# Patient Record
Sex: FEMALE | Race: WHITE | URBAN - METROPOLITAN AREA
[De-identification: names, ages, dates, MRNs, and addresses within clinical notes are randomized per-mention and may not be internally consistent; named-entity substitution may affect disease eponyms.]

---

## 2017-01-19 ENCOUNTER — OFFICE VISIT (OUTPATIENT)
Dept: ORTHOPEDICS | Facility: CLINIC | Age: 52
End: 2017-01-19

## 2017-01-19 VITALS — BODY MASS INDEX: 31.82 KG/M2 | HEIGHT: 66 IN | WEIGHT: 198 LBS

## 2017-01-19 DIAGNOSIS — M25.551 HIP PAIN, RIGHT: ICD-10-CM

## 2017-01-19 DIAGNOSIS — M70.61 TROCHANTERIC BURSITIS OF BOTH HIPS: ICD-10-CM

## 2017-01-19 DIAGNOSIS — M70.62 TROCHANTERIC BURSITIS OF BOTH HIPS: ICD-10-CM

## 2017-01-19 DIAGNOSIS — M25.552 HIP PAIN, LEFT: Primary | ICD-10-CM

## 2017-01-19 RX ORDER — METHYLPREDNISOLONE ACETATE 40 MG/ML
40 INJECTION, SUSPENSION INTRA-ARTICULAR; INTRALESIONAL; INTRAMUSCULAR; SOFT TISSUE ONCE
Qty: 2 ML | Refills: 0 | OUTPATIENT
Start: 2017-01-19 | End: 2017-01-19

## 2017-01-19 RX ORDER — BUPIVACAINE HYDROCHLORIDE 5 MG/ML
12 INJECTION, SOLUTION PERINEURAL ONCE
Qty: 12 ML | Refills: 0 | OUTPATIENT
Start: 2017-01-19 | End: 2017-01-19

## 2017-01-19 RX ORDER — LIDOCAINE HYDROCHLORIDE 10 MG/ML
6 INJECTION, SOLUTION INFILTRATION; PERINEURAL ONCE
Qty: 6 ML | Refills: 0 | OUTPATIENT
Start: 2017-01-19 | End: 2017-01-19

## 2017-01-19 NOTE — Clinical Note
1/19/2017       RE: Becka Calle  [address] [place] [address]     Dear Colleague,    Thank you for referring your patient, Becka Calle, to the [facility] at [facility]. Please see a copy of my visit note below.    HISTORY OF PRESENT ILLNESS:  Becka is an individual on whom back in 2015 I performed an SI fusion in conjunction with Dr. Yu, and that has done very well.  She just recently had lumbar spine fusion by Dr. Reina from [facility] and is back today requesting an injection of her trochanteric bursa, with bursitis bilaterally.  Apparently, she gets better results with my injections than anybody else.      ASSESSMENT:  Trochanteric bursitis.      PLAN:  Today, after informed consent was obtained, she was sterilely prepped and draped, and she was injected with 3 mL of lidocaine 1%, 6 mL of Marcaine 0.5% and Depo-Medrol 40 mg, bilaterally on each hip.  She had initial improvement of symptoms associated with the local anesthetic injection.  I also told her I would like to get her in to see a hip doctor and made a referral to Dr. Rik Valdovinos, who would be delighted to see her.       Again, thank you for allowing me to participate in the care of your patient.      Sincerely,    Justin Sampson MD

## 2017-01-19 NOTE — PROGRESS NOTES
HISTORY OF PRESENT ILLNESS:  Becka is an individual on whom back in 2015 I performed an SI fusion in conjunction with Dr. Yu, and that has done very well.  She just recently had lumbar spine fusion by Dr. Reina from [facility] and is back today requesting an injection of her trochanteric bursa, with bursitis bilaterally.  Apparently, she gets better results with my injections than anybody else.      ASSESSMENT:  Trochanteric bursitis.      PLAN:  Today, after informed consent was obtained, she was sterilely prepped and draped, and she was injected with 3 mL of lidocaine 1%, 6 mL of Marcaine 0.5% and Depo-Medrol 40 mg, bilaterally on each hip.  She had initial improvement of symptoms associated with the local anesthetic injection.  I also told her I would like to get her in to see a hip doctor and made a referral to Dr. Rik Valdovinos, who would be delighted to see her.

## 2017-01-19 NOTE — NURSING NOTE
"Reason For Visit:   Chief Complaint   Patient presents with     RECHECK     madhav hip pain, s/p madhav bursa injections 10/12/16       Primary MD: Shanna Mccallum  Ref. MD: Dr. Yu      Occupation none.  Currently working? No.  Work status?  On disability.  Date of injury: none    Date of surgery: 12.1.15  Type of surgery: OPERATION PERFORMED:  There were 2 separate episodes of surgery:    Episode of surgery #1:  Anterior ilioinguinal open approach right plate removal and SI joint revision of fusion.    Episode of surgery #2:  Prone image-guided iliosacral screw fixation.  .  Smoker: No    Ht 1.67 m (5' 5.75\")  Wt 89.812 kg (198 lb)  BMI 32.20 kg/m2    Pain Assessment  Patient Currently in Pain: Yes  0-10 Pain Scale: 8  Primary Pain Location: Back  Pain Orientation: Lower  Other Pain Locations: both hips  Pain Descriptors: Aching  Alleviating Factors: NSAIDS, Ice  Aggravating Factors: Walking    Oswestry (KAVAY) Questionnaire    OSWESTRY DISABILITY INDEX 1/19/2017   Section 1 - Pain intensity -   Section 2 - Personal care (washing, dressing, etc.)  -   Section 3 - Lifting -   Section 4 - Walking -   Section 5 - Sitting -   Section 6 - Standing -   Section 7 - Sleeping -   Section 8 - Sex life (if applicable) -   Section 9 - Social life -   Section 10 - Traveling -   Count -   Sum -   Oswestry Score (%) -   SECTION 1-PAIN INTENSITY 4  The pain is very severe at the moment.   SECTION 2-PERSONAL CARE (WASHING,DRESSING,ETC.) 3  I need some help but manage most of my personal care.   SECTION 3-LIFTING 4  I can only lift very light weights.   SECTION 4-WALKING 3  Pain prevents me from walking more than 100 yards.   SECTION 5-SITTING 4  Pain prevents me from sitting for more than 10 minutes   SECTION 6-STANDING 4  Pain prevents me from standing for more than 10 minutes.   SECTION 7-SLEEPING 3  Because of pain I have less than 4 hours sleep.   SECTION 8-SEX LIFE (IF APPLICABLE) 3  My sex life is severely restricted by " pain.   SECTION 9-SOCIAL LIFE 4  Pain has restricted my social life to home.   SECTION 10-TRAVELING 4  Pain restricts me to short necessary trips of under 30 minutes.   Oswestry Disability Index: Count 10   KAVYA: Total Score = SUM (total for answered questions) 36   Computed Oswestry Score 72 (%)                      Numeric Rating Scale:  VAS Scores     VAS Survey 1/19/2017   What is your level of back pain during the last week: 8.0   What is your level of RIGHT leg pain during the last week: 8.0   What is your level of LEFT leg pain during the last week: 5.0   What is your level of neck pain during the last week: 0.5   What is your level of RIGHT arm pain during the last week: 0.5   What is your level of LEFT arm pain during the last week: 0.5                Promis 10 Assessment    PROMIS 10 1/19/2017   In general, would you say your health is: Very Good = 4   In general, would you say your quality of life is: Good = 3   In general, how would you rate your physical health? Very good = 4   In general, how would you rate your mental health, including your mood and your ability to think? Very good = 4   In general, how would you rate your satisfaction with your social activities and relationships? Fair = 2   In general, please rate how well you carry out your usual social activities and roles Fair = 2   To what extent are you able to carry out your everyday physical activities such as walking, climbing stairs, carrying groceries, or moving a chair? A Little = 2   How often have you been bothered by emotional problems such as feeling anxious, depressed or irritable? Rarely = 4   How would you rate your fatigue on average? Moderate = 3   How would you rate your pain on average?   0 = No Pain  to  10 = Worst Imaginable Pain 7   Global Physical Health Score : Raw Score 11 (SUM : G03 - G06 - G07 - G08)   Global Mentall Health Score : Raw Score 13 (SUM : G02 - G04 - G05 - G10)   Total (Physical + Mental Health Score) 24    In general, would you say your health is: -   In general, would you say your quality of life is: -   In general, how would you rate your physical health? -   In general, how would you rate your mental health, including your mood and your ability to think? -   In general, how would you rate your satisfaction with your social activities and relationships? -   In general, please rate how well you carry out your usual social activities and roles. (This includes activities at home, at work and in your community, and responsibilities as a parent, child, spouse, employee, friend, etc.) -   To what extent are you able to carry out your everyday physical activities such as walking, climbing stairs, carrying groceries, or moving a chair? -   In the past 7 days, how often have you been bothered by emotional problems such as feeling anxious, depressed, or irritable? -   In the past 7 days, how would you rate your fatigue on average? -   In the past 7 days, how would you rate your pain on average, where 0 means no pain, and 10 means worst imaginable pain? -   Global Mental Health Score -   Global Physical Health Score -   PROMIS TOTAL - SUBSCORES -   Pain question re-calculation - no clinical value [facility]  [address] [place] [address]  Dept: [phone]  ______________________________________________________________________________    Patient: Becka Calle   : 1965   MRN: [id number]   2017    INVASIVE PROCEDURE SAFETY CHECKLIST    Date: 2017   Procedure:steroid injection to madhav trochanteric bursa  Patient Name: Becka Calle  MRN: [id number]  YOB: 1965    Action: Complete sections as appropriate. Any discrepancy results in a HARD COPY until resolved.     PRE PROCEDURE:  Patient ID verified with 2 identifiers (name and  or MRN): Yes  Procedure and site verified with patient/designee (when able): Yes  Accurate  consent documentation in medical record: Yes  H&P (or appropriate assessment) documented in medical record: NA  H&P must be up to 20 days prior to procedure and updates within 24 hours of procedure as applicable: NA  Relevant diagnostic and radiology test results appropriately labeled and displayed as applicable: NA  Procedure site(s) marked with provider initials: NA    TIMEOUT:  Time-Out performed immediately prior to starting procedure, including verbal and active participation of all team members addressing the following:YesTeaching Flowsheet   Relevant Diagnosis: madhav hip trochanteric bursitis  Teaching Topic: steroid injection to madhav hip trochanteric bursa     Person(s) involved in teaching:   Patient     Motivation Level:  Asks Questions: Yes  Eager to Learn: Yes  Cooperative: Yes  Receptive (willing/able to accept information): Yes  Any cultural factors/Lutheran beliefs that may influence understanding or compliance? No       Patient demonstrates understanding of the following:  Reason for the appointment, diagnosis and treatment plan: Yes  Knowledge of proper use of medications and conditions for which they are ordered (with special attention to potential side effects or drug interactions): Yes  Which situations necessitate calling provider and whom to contact: Yes       Teaching Concerns Addressed:        Proper use and care of n/a (medical equip, care aids, etc.): NA  Nutritional needs and diet plan: NA  Pain management techniques: NA  Wound Care: NA  How and/when to access community resources: NA     Instructional Materials Used/Given: verbal instruction         * Correct patient identify  * Confirmed that the correct side and site are marked  * An accurate procedure consent form  * Agreement on the procedure to be done  * Correct patient position  * Relevant images and results are properly labeled and appropriately displayed  * The need to administer antibiotics or fluids for irrigation purposes during  the procedure as applicable   * Safety precautions based on patient history or medication use    DURING PROCEDURE: Verification of correct person, site, and procedures any time the responsibility for care of the patient is transferred to another member of the care team.

## 2017-01-19 NOTE — MR AVS SNAPSHOT
After Visit Summary   1/19/2017    Becka Calle    MRN: [id number]           Patient Information     Date Of Birth          1965        Visit Information        Provider Department      1/19/2017 2:30 PM Justin Sampson MD [facility]        Today's Diagnoses     Hip pain, left    -  1     Hip pain, right            Follow-ups after your visit        Additional Services     ORTHOPEDICS ADULT REFERRAL       Your provider has referred you to: Dr Rik Valdovinos bilat hip pain recurring greater troch injections needs a hip doctor eval rather than a spine doc treating her hips    Please be aware that coverage of these services is subject to the terms and limitations of your health insurance plan.  Call member services at your health plan with any benefit or coverage questions.      Please bring the following to your appointment:    >>   Any x-rays, CTs or MRIs which have been performed.  Contact the facility where they were done to arrange for  prior to your scheduled appointment.    >>   List of current medications   >>   This referral request   >>   Any documents/labs given to you for this referral                  Your next 10 appointments already scheduled     Apr 19, 2017  2:30 PM   (Arrive by 2:15 PM)   NEW HIP with Rik Valdovinos MD   [facility] ([facility])    [address] [place] [address] [phone]            May 25, 2017  2:30 PM   (Arrive by 2:00 PM)   RETURN SPINE with Justin Sampson MD   [facility] ([facility])    [address] [place] [address] [phone]              Who to contact     Please call your clinic at [phone] to:    Ask questions about your health    Make or cancel appointments    Discuss your medicines    Learn about your test results    Speak to your doctor   If you have compliments  "or concerns about an experience at your clinic, or if you wish to file a complaint, please contact [facility] Patient Relations at [phone] or email us at [email or web address]         Additional Information About Your Visit        [employer] Information     [employer] is an electronic gateway that provides easy, online access to your medical records. With [employer], you can request a clinic appointment, read your test results, renew a prescription or communicate with your care team.     To sign up for [employer] visit the website at [email or web address]   You will be asked to enter the access code listed below, as well as some personal information. Please follow the directions to create your username and password.     Your access code is: [email or web address]  Expires: 2017  6:30 AM     Your access code will  in 90 days. If you need help or a new code, please contact your [facility] or call [phone] for assistance.        Care EveryWhere ID     This is your Care EveryWhere ID. This could be used by other organizations to access your [place] medical records  [id number]        Your Vitals Were     Height BMI (Body Mass Index)                5' 5.75\" (1.67 m) 32.20 kg/m2           Blood Pressure from Last 3 Encounters:   12/06/15 141/73   14 99/60   10/27/14 140/80    Weight from Last 3 Encounters:   17 198 lb (89.812 kg)   12/01/15 176 lb 2.4 oz (79.9 kg)   10/29/15 179 lb (81.194 kg)              We Performed the Following     ORTHOPEDICS ADULT REFERRAL          Today's Medication Changes          These changes are accurate as of: 17  3:29 PM.  If you have any questions, ask your nurse or doctor.               These medicines have changed or have updated prescriptions.        Dose/Directions    LORazepam 1 MG tablet   Commonly known as:  ATIVAN   This may have changed:    - how much to take  - when to take this   Used for:  " Anxiety        Dose:  1 mg   Take 1 tablet (1 mg) by mouth every 12 hours as needed for anxiety   Quantity:  60 tablet   Refills:  0         Stop taking these medicines if you haven't already. Please contact your care team if you have questions.     HYDROmorphone 4 MG tablet   Commonly known as:  DILAUDID           hydrOXYzine 25 MG tablet   Commonly known as:  ATARAX                    Primary Care Provider Office Phone # Fax #    Shanna BRIANNE Mccallum PA-C [phone]       [facility] [address] [address]        Thank you!     Thank you for choosing [facility]  for your care. Our goal is always to provide you with excellent care. Hearing back from our patients is one way we can continue to improve our services. Please take a few minutes to complete the written survey that you may receive in the mail after your visit with us. Thank you!             Your Updated Medication List - Protect others around you: Learn how to safely use, store and throw away your medicines at www.disposemymeds.org.          This list is accurate as of: 1/19/17  3:29 PM.  Always use your most recent med list.                   Brand Name Dispense Instructions for use    ARIPiprazole 5 MG tablet    ABILIFY    60 tablet    Take 1 tablet (5 mg) by mouth daily       DIAZEPAM PO      Take 5 mg by mouth every 8 hours as needed       diclofenac 1 % Gel topical gel    VOLTAREN    60 g    Small amount to affected area QID prn       LORazepam 1 MG tablet    ATIVAN    60 tablet    Take 1 tablet (1 mg) by mouth every 12 hours as needed for anxiety       losartan 50 MG tablet    COZAAR     Take 50 mg by mouth daily       methocarbamol 500 MG tablet    ROBAXIN    30 tablet    TAKE 1 TABLET(500 MG) BY MOUTH THREE TIMES DAILY AS NEEDED FOR MUSCLE SPASMS       metoprolol 100 MG 24 hr tablet    TOPROL-XL    90 tablet    Take 1 tablet (100 mg) by mouth daily       NYSTOP 594368 UNIT/GM Powd powder       Externally apply topically 2 times daily as needed       OMEPRAZOLE PO      Take 20 mg by mouth every morning       * OXYCODONE HCL PO      Take 10 mg by mouth 4 times daily       * oxyCODONE 20 MG 12 hr tablet    OxyCONTIN    40 tablet    Take 1 tablet (20 mg) by mouth every 12 hours       senna-docusate 8.6-50 MG per tablet    SENOKOT-S;PERICOLACE    80 tablet    Take 2 tablets by mouth 2 times daily as needed for constipation Pt taking differently: 7 tablets every other evening       simvastatin 10 MG tablet    ZOCOR     Take 10 mg by mouth At Bedtime       SUMAtriptan 100 MG tablet    IMITREX     Take by mouth at onset of headache       traZODone 50 MG tablet    DESYREL     Take by mouth At Bedtime       venlafaxine 75 MG 24 hr capsule    EFFEXOR-XR    270 capsule    Take 3 capsules (225 mg) by mouth daily       * Notice:  This list has 2 medication(s) that are the same as other medications prescribed for you. Read the directions carefully, and ask your doctor or other care provider to review them with you.

## 2017-02-15 ENCOUNTER — PRE VISIT (OUTPATIENT)
Dept: ORTHOPEDICS | Facility: CLINIC | Age: 52
End: 2017-02-15

## 2017-02-15 NOTE — TELEPHONE ENCOUNTER
1.  Date/reason for appt: 4/19/17 - Requesting Bursa Injection  2.  Referring provider: Dr. Sampson  3.  Call to patient (Yes / No - short description): No, established pt to Ortho clinic  4.  Previous care at / records requested from: [facility] -- Records and imaging in Epic/Pacs. Camilla pt, pt was also seen with Ирина Keenan.

## 2017-04-19 ENCOUNTER — OFFICE VISIT (OUTPATIENT)
Dept: ORTHOPEDICS | Facility: CLINIC | Age: 52
End: 2017-04-19

## 2017-04-19 VITALS — WEIGHT: 201 LBS | BODY MASS INDEX: 32.3 KG/M2 | HEIGHT: 66 IN

## 2017-04-19 DIAGNOSIS — M70.62 GREATER TROCHANTERIC BURSITIS OF BOTH HIPS: Primary | ICD-10-CM

## 2017-04-19 DIAGNOSIS — M25.551 HIP PAIN, RIGHT: Primary | ICD-10-CM

## 2017-04-19 DIAGNOSIS — M70.61 GREATER TROCHANTERIC BURSITIS OF BOTH HIPS: Primary | ICD-10-CM

## 2017-04-19 ASSESSMENT — ENCOUNTER SYMPTOMS
POLYDIPSIA: 0
POSTURAL DYSPNEA: 0
WEIGHT GAIN: 1
HYPERTENSION: 1
NECK MASS: 0
FLANK PAIN: 0
COUGH: 0
EYE PAIN: 0
STIFFNESS: 1
MUSCLE WEAKNESS: 1
DYSPNEA ON EXERTION: 0
SINUS CONGESTION: 1
NECK PAIN: 0
SPUTUM PRODUCTION: 1
LIGHT-HEADEDNESS: 0
DIFFICULTY URINATING: 0
POLYPHAGIA: 0
SORE THROAT: 0
DOUBLE VISION: 0
ALTERED TEMPERATURE REGULATION: 1
TROUBLE SWALLOWING: 0
HYPOTENSION: 0
NAIL CHANGES: 0
EXERCISE INTOLERANCE: 1
SYNCOPE: 0
RESPIRATORY PAIN: 0
SHORTNESS OF BREATH: 0
BACK PAIN: 1
BRUISES/BLEEDS EASILY: 0
HEMOPTYSIS: 0
WHEEZING: 0
HEMATURIA: 0
SNORES LOUDLY: 1
SINUS PAIN: 1
FEVER: 0
EYE WATERING: 0
EYE IRRITATION: 0
POOR WOUND HEALING: 0
SKIN CHANGES: 0
LEG SWELLING: 0
EYE REDNESS: 0
HOARSE VOICE: 0
TACHYCARDIA: 0
SMELL DISTURBANCE: 0
MUSCLE CRAMPS: 0
CHILLS: 0
TASTE DISTURBANCE: 0
SWOLLEN GLANDS: 0
DECREASED APPETITE: 0
NIGHT SWEATS: 0
WEIGHT LOSS: 0
INCREASED ENERGY: 1
ARTHRALGIAS: 1
CLAUDICATION: 0
PALPITATIONS: 0
SLEEP DISTURBANCES DUE TO BREATHING: 0
LEG PAIN: 1
JOINT SWELLING: 0
COUGH DISTURBING SLEEP: 0
MYALGIAS: 1
ORTHOPNEA: 0
FATIGUE: 1

## 2017-04-19 ASSESSMENT — HOOS JR
GOING UP OR DOWN STAIRS: 3
WALKING ON UNEVEN SURFACE: 4
BENDING TO THE FLOOR TO PICK UP OBJECT: 4
HOOS JR TOTAL INTERVAL SCORE: 46.65
WHAT AMOUNT OF HIP PAIN HAVE YOU EXPERIENCED THE LAST WEEK DURING THE FOLLOWING ACTIVITIES: 1
HOOS JR FUNCTION SCORE: 1
LYING IN BED (TURNING OVER, MAINTAINING HIP POSITION): 3
SITTING: 3
RISING FROM SITTING: 3

## 2017-04-19 NOTE — NURSING NOTE
"Reason For Visit:   Chief Complaint   Patient presents with     Consult     Bilateral hip pain. patient requesting repeat bursa injection, DOS 12/1/15 anterior right SI joint fusion, referred by Dr. Sampson.       Primary MD: Shanna Mccallum      Ht 1.676 m (5' 6\")  Wt 91.2 kg (201 lb)  BMI 32.44 kg/m2    Pain Assessment  Patient Currently in Pain: Yes  0-10 Pain Scale: 7  Primary Pain Location: Hip  Pain Orientation: Right, Left  Pain Descriptors: Constant, Discomfort      Current Outpatient Prescriptions   Medication     OXYCODONE HCL PO     methocarbamol (ROBAXIN) 500 MG tablet     oxyCODONE (OXYCONTIN) 20 MG 12 hr tablet     senna-docusate (SENOKOT-S;PERICOLACE) 8.6-50 MG per tablet     losartan (COZAAR) 50 MG tablet     Nystatin (NYSTOP) 925644 UNIT/GM POWD     simvastatin (ZOCOR) 10 MG tablet     diclofenac (VOLTAREN) 1 % GEL     DIAZEPAM PO     OMEPRAZOLE PO     traZODone (DESYREL) 50 MG tablet     SUMAtriptan (IMITREX) 100 MG tablet     LORazepam (ATIVAN) 1 MG tablet     venlafaxine (EFFEXOR-XR) 75 MG 24 hr capsule     metoprolol (TOPROL-XL) 100 MG 24 hr tablet     ARIPiprazole (ABILIFY) 5 MG tablet     No current facility-administered medications for this visit.           Allergies   Allergen Reactions     Buspirone      Excessively axnious     Compazine      Lock jaw     Dilantin [Alc-Phenytoin Na-Propylene Glycol]      Bleeding around gums     Gabapentin      Cannot stay awake      Keflex [Cephalexin Hcl] Hives     Pt states she passed out from this medication     Penicillins Hives     Pt states she passed out form taking this medication     Reglan      Vomiting     Requip      Cannot breathe     Wellbutrin [Bupropion Hydrobromide]      Acid reflux     Sulfa Drugs Rash     "

## 2017-04-19 NOTE — PROGRESS NOTES
[facility] Physicians, Orthopaedic Surgery, Arthritis, Hip and Knee Replacement    Becka Calle MRN# [id number]   Age: 51 year old YOB: 1965     Requesting physician: Justin Sampson              History of Present Illness:   Becka Calle is a 51F  Becka Mccall is a pleasant 51-year-old female who was referred to Dr. Valdovinos's clinic from Dr. Sampson for bilateral hip trochanteric pain.  The patient does have a history of chronic low back pain including SI joint fusion with subsequent revisions and lumbar fusions.  She reports that she has had the bilateral hip pain since childhood.  She denies any specific trauma or injuries that brought them on.  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  Previous treatments include repeated injections, most recently on 01/19/2017 by Dr. Sampson.  With this, she had approximately 90%-100% relief initially with the lidocaine portion of the injection.  These injections do last the injections.  These injections do last for about 2-3 months.  She has also tried ice and takes narcotics.  She currently takes oxycodone 10 mg t.i.d. and OxyContin 20 mg b.i.d. prescribed by her primary care provider for many years.  She feels that the right is worse, but that the left hip is now worsening.  It particularly hurts when she is resting at night and lying on her sides.  She denies any consistent groin or thigh pain.  She denies any recent lumbar injections.  She does use a cane or walker occasionally for longer distances and she is limited to 1 block due to pain.  She also uses a chair lift at home for up and down the stairs when her pain is particularly bad.  She has tried physical therapy in the past and does a home exercise program for working on her strength.  She also has been evaluated in pain clinics for her low back, SI joint and hip pain.  She recently spent an entire  month at the [facility] in a pain evaluation process.  She currently denies any mechanical symptoms of the hip.  She denies any numbness, tingling or weakness.          Prior history of blood clot: none  Prior history of bleeding problems: mild anemia, no bleeding dyscrasias  Prior history of anesthetic complications: none  Currently on opioids:  yes, oxycodone 10 mg TID, oxycontin 20 mg BID  History of Diabetes: none  Disability, was , no smoke, no etoh, [place] in [place], house with     Background history:  DX:  1. Bilateral trochanteric bursitis    TREATMENTS:  1. Trochanteric bursal injections, most recently 1/19/2017 by Dr Sampson             Past Medical History:     Patient Active Problem List   Diagnosis     HTN (hypertension)     Hyperlipidemia with target LDL less than 130     Anxiety     Chronic pain disorder     SI (sacroiliac) joint dysfunction     Past Medical History:   Diagnosis Date     Agoraphobia      Depression      Epilepsy (H)      Hyperlipidemia      Hypertension      Lumbar disc disease      Migraines      Obesity      Pain medication agreement      PTSD (post-traumatic stress disorder)      RLS (restless legs syndrome)      Tachycardia      Vitamin D deficiency                 Past Surgical History:     Past Surgical History:   Procedure Laterality Date     CARPAL TUNNEL RELEASE RT/LT       FUSION SACRAL ILIAC  March 2013     HYSTERECTOMY, PAP NO LONGER INDICATED       LAMINECTOMY, FUSION LUMBAR THREE+ LEVEL, COMBINED  march 2010    had revision     laparoscopic gastrectomy sleeve       OPEN REDUCTION INTERNAL FIXATION PELVIS Right 12/1/2015    Procedure: OPEN REDUCTION INTERNAL FIXATION PELVIS;  Surgeon: Christian Yu MD;  Location: [facility] OR     OPTICAL TRACKING SYSTEM FUSION SACRAL ILIAC Right 12/1/2015    Procedure: OPTICAL TRACKING SYSTEM FUSION SACRAL ILIAC;  Surgeon: Justin Sampson MD;  Location: [facility] OR     REMOVE HARDWARE ANTERIOR PELVIS Right  2015    Procedure: REMOVE HARDWARE ANTERIOR PELVIS;  Surgeon: Christian Yu MD;  Location: [facility] OR     SALPINGO OOPHORECTOMY,R/L/CHANELLE      Salpingo Oophorectomy, RT/LT/CHANELLE     TONSILLECTOMY & ADENOIDECTOMY              Social History:     Social History     Social History     Marital status:      Spouse name: N/A     Number of children: N/A     Years of education: N/A     Occupational History     Not on file.     Social History Main Topics     Smoking status: Former Smoker     Quit date: 10/10/2008     Smokeless tobacco: Never Used      Comment: 2 cartons in 25 years     Alcohol use No     Drug use: No     Sexual activity: Not on file     Other Topics Concern     Not on file     Social History Narrative     The patient lives in [place] with her  in house.          Family History:       Family History   Problem Relation Age of Onset     Genitourinary Problems Mother 88     born [birth year]     Hypertension Mother 40     HEART DISEASE Father 50      87yo and dej disc     Family History Negative Son      Breast Cancer Maternal Grandmother       41yo      Alzheimer Disease Maternal Grandfather 70      71yo      CEREBROVASCULAR DISEASE Maternal Grandfather      age?     CEREBROVASCULAR DISEASE Mother 50     HEART DISEASE Paternal Grandmother       59yo      HEART DISEASE Paternal Grandfather       59yo     HEART DISEASE Brother 50     Family History Negative Brother      Family History Negative Sister      Family History Negative Sister        Family history of blood clot: none  Family history of bleeding problems: none  Family history of anesthetic complications: none         Medications:     Current Outpatient Prescriptions   Medication Sig     OXYCODONE HCL PO Take 10 mg by mouth 3 times daily      methocarbamol (ROBAXIN) 500 MG tablet TAKE 1 TABLET(500 MG) BY MOUTH THREE TIMES DAILY AS NEEDED FOR MUSCLE SPASMS     oxyCODONE (OXYCONTIN) 20 MG 12 hr tablet  "Take 1 tablet (20 mg) by mouth every 12 hours     senna-docusate (SENOKOT-S;PERICOLACE) 8.6-50 MG per tablet Take 2 tablets by mouth 2 times daily as needed for constipation Pt taking differently: 7 tablets every other evening     losartan (COZAAR) 50 MG tablet Take 50 mg by mouth daily     Nystatin (NYSTOP) 461536 UNIT/GM POWD Externally apply topically 2 times daily as needed     simvastatin (ZOCOR) 10 MG tablet Take 10 mg by mouth At Bedtime     diclofenac (VOLTAREN) 1 % GEL Small amount to affected area QID prn     DIAZEPAM PO Take 5 mg by mouth every 8 hours as needed      OMEPRAZOLE PO Take 20 mg by mouth every morning     traZODone (DESYREL) 50 MG tablet Take by mouth At Bedtime     SUMAtriptan (IMITREX) 100 MG tablet Take by mouth at onset of headache     LORazepam (ATIVAN) 1 MG tablet Take 1 tablet (1 mg) by mouth every 12 hours as needed for anxiety (Patient taking differently: Take 2 mg by mouth At Bedtime )     venlafaxine (EFFEXOR-XR) 75 MG 24 hr capsule Take 3 capsules (225 mg) by mouth daily     metoprolol (TOPROL-XL) 100 MG 24 hr tablet Take 1 tablet (100 mg) by mouth daily     ARIPiprazole (ABILIFY) 5 MG tablet Take 1 tablet (5 mg) by mouth daily     No current facility-administered medications for this visit.                   Physical Exam:     EXAMINATION pertinent findings:   VITAL SIGNS: Height 1.676 m (5' 6\"), weight 91.2 kg (201 lb).  Body mass index is 32.44 kg/(m^2).  GEN: AOx3, appears stated age, cooperative, no distress  HEENT: normocephalic, atraumatic  RESP: non labored breathing   ABD: benign   SKIN: No rashes or open lesions   CV: Non-tachycardic   NEURO: CN2-12 grossly intact   VASCULAR: 2+ DP pulse   MUSCULOSKELETAL:   The patient demonstrates an antalgic gait with decreased stance time on the right side.  In standing position, she is able to support a single limb stance, although this causes a significant amount of pain.  There is no obvious Trendelenburg sign bilaterally.  She " has significant tenderness to palpation along the greater trochanter, and this elicits a yelp from the patient when either right side or left side is palpated.      She otherwise has full range of motion of the bilateral hips.  She has a smooth arc of motion through full range from 0-110 degrees of flexion and extension bilaterally.  She has full internal and external rotation with no crepitus.  Both movements do cause some pain along the greater trochanter, but none within the groin.  She has a negative straight leg raise test for back pain or radiating pain.  She demonstrates significant weakness an abductor complex with side-lying hip abduction.  She is unable to perform a sustained abduction position against resistant force, and this elicits a significant amount of pain bilaterally.  Clamshell exercises also demonstrates weakness of the gluteus medius.  She has a negative Pooja's test for a tight IT band, but does elicit pain, right greater than left.  CMS is intact.  Motor intact to EHL/FHL/TA/GSC.  SILT to SP, DP, sural, saphenous and tibial nerve distributions.  Foot is warm and well perfused, and cap refill is less than 2 seconds.   Dictated by Ari Villagomez MD, Resident                    Data:   Imagin2017 bilateral hip AP/lat   -no acute bony pathology, hips are concentric with well maintained joint line             Assessment and Plan:   Assessment:  51F with hx of chronic low back pain with pervious lumbar and SI joint fusions, with chronic greater trochanteric bursitis in setting of hip abductor weakness    DIscussed with patient that her symptoms are likely from chronic weakness of abductors and that best treatment would be continued and aggressive strengthening, which is difficult given her chronic pain.  We also discussed that she may benefit from pool therapy.  Recommened follow up in pain clinic and that she consider attempts to wean off the narcotic pain medication.     Plan:  Injections  today, referral to PT  Follow up PRN  Recommend f/u with pain clinic or non-op sports for repeated troch injections    PROCEDURE NOTE:    After obtaining informed consent, under sterile precautions, bilateral greater trochanteric injections were performed.  5 cc of 1% lidocaine and 40 mg/ml of depo-medrol were used.  Sterile dressing applied.  She tolerated the procedure well.  Following the injections, she continued to demonstrate weakness of her bilateral hip abduction complex.       I saw the patient and performed the key portions of the history and physical.  I agree with the note above.      Rik Valdovinos M.D.     Arthritis and Joint Replacement  Department of Orthopaedic Surgery, [facility]  [email or web address]  [phone] (pager)           Review of Systems:       Answers for HPI/ROS submitted by the patient on 4/19/2017   General Symptoms: Yes  Skin Symptoms: Yes  HENT Symptoms: Yes  EYE SYMPTOMS: Yes  HEART SYMPTOMS: Yes  LUNG SYMPTOMS: Yes  INTESTINAL SYMPTOMS: No  URINARY SYMPTOMS: Yes  GYNECOLOGIC SYMPTOMS: No  BREAST SYMPTOMS: No  SKELETAL SYMPTOMS: Yes  BLOOD SYMPTOMS: Yes  NERVOUS SYSTEM SYMPTOMS: No  MENTAL HEALTH SYMPTOMS: No  Fever: No  Loss of appetite: No  Weight loss: No  Weight gain: Yes  Fatigue: Yes  Night sweats: No  Chills: No  Increased stress: No  Excessive hunger: No  Excessive thirst: No  Feeling hot or cold when others believe the temperature is normal: Yes  Loss of height: No  Post-operative complications: Yes  Surgical site pain: No  Change in or Loss of Energy: Yes  Hyperactivity: No  Confusion: No  Changes in hair: Yes  Changes in moles/birth marks: No  Itching: Yes  Rashes: Yes  Changes in nails: No  Acne: No  Hair in places you don't want it: No  Change in facial hair: No  Warts: No  Non-healing sores: No  Flaking of skin: No  Color changes of hands/feet in cold : No  Sun sensitivity: No  Skin thickening: No  Ear pain: No  Ear discharge:  No  Hearing loss: No  Tinnitus: No  Congestion: Yes  Sinus pain: Yes  Trouble swallowing: No   Voice hoarseness: No  Mouth sores: No  Sore throat: No  Tooth pain: No  Gum tenderness: No  Bleeding gums: No  Change in taste: No  Change in sense of smell: No  Dry mouth: No  Hearing aid used: No  Neck lump: No  Eye pain: No  Vision loss: No  Dry eyes: No  Watery eyes: No  Eye bulging: No  Double vision: No  Flashing of lights: No  Spots: No  Floaters: Yes  Redness: No  Crossed eyes: No  Tunnel Vision: No  Yellowing of eyes: No  Eye irritation: No  Cough: No  Sputum or phlegm: Yes  Coughing up blood: No  Difficulty breating or shortness of breath: No  Snoring: Yes  Wheezing: No  Difficulty breathing on exertion: No  Respiratory pain: No  Nighttime Cough: No  Difficulty breathing when lying flat: No  Chest pain or pressure: No  Fast or irregular heartbeat: No  Pain in legs with walking: Yes  Swelling in feet or ankles: No  Trouble breathing while lying down: No  Fingers or Toes appear blue: No  High blood pressure: Yes  Low blood pressure: No  Fainting: No  Murmurs: No  Chest pain on exertion: No  Chest pain at rest: No  Cramping pain in leg during exercise: No  Pacemaker: No  Varicose veins: No  Edema or swelling: No  Fast heart beat: No  Wake up at night with shortness of breath: No  Heart flutters: No  Light-headedness: No  Exercise intolerance: Yes  Trouble holding urine or incontinence: No  Trouble starting or stopping: No  Increased frequency of urination: Yes  Blood in urine: No  Decreased frequency of urination: No  Frequent nighttime urination: No  Flank pain: No  Difficulty emptying bladder: No  Back pain: Yes  Muscle aches: Yes  Neck pain: No  Swollen joints: No  Joint pain: Yes  Bone pain: No  Muscle cramps: No  Muscle weakness: Yes  Joint stiffness: Yes  Bone fracture: No  Anemia: Yes  Swollen glands: No  Easy bleeding or bruising: No

## 2017-04-19 NOTE — NURSING NOTE
[facility]ED[facility]  [address] [place] [id number]  Dept: [phone]  ______________________________________________________________________________    Patient: Becka Calle   : 1965   MRN: [id number]   2017    INVASIVE PROCEDURE SAFETY CHECKLIST    Date: 2017   Procedure:Bilateral Bursa sterioid injections  Patient Name: Becka Calle  MRN: [id number]  YOB: 1965    Action: Complete sections as appropriate. Any discrepancy results in a HARD COPY until resolved.     PRE PROCEDURE:  Patient ID verified with 2 identifiers (name and  or MRN): Yes  Procedure and site verified with patient/designee (when able): Yes  Accurate consent documentation in medical record: Yes  H&P (or appropriate assessment) documented in medical record: Yes  H&P must be up to 20 days prior to procedure and updates within 24 hours of procedure as applicable: Yes  Relevant diagnostic and radiology test results appropriately labeled and displayed as applicable: Yes  Procedure site(s) marked with provider initials: Yes    TIMEOUT:  Time-Out performed immediately prior to starting procedure, including verbal and active participation of all team members addressing the following:Yes  * Correct patient identify  * Confirmed that the correct side and site are marked  * An accurate procedure consent form  * Agreement on the procedure to be done  * Correct patient position  * Relevant images and results are properly labeled and appropriately displayed  * The need to administer antibiotics or fluids for irrigation purposes during the procedure as applicable   * Safety precautions based on patient history or medication use    DURING PROCEDURE: Verification of correct person, site, and procedures any time the responsibility for care of the patient is transferred to another member of the care team.

## 2017-04-19 NOTE — LETTER
4/19/2017       RE: Becka Calle  [address] [place] [address]     Dear Colleague,    Thank you for referring your patient, Becka Calle, to the [facility] at [facility]. Please see a copy of my visit note below.    [facility], Orthopaedic Surgery, Arthritis, Hip and Knee Replacement    Becka Calle MRN# [id number]   Age: 51 year old YOB: 1965     Requesting physician: Justin Sampson              History of Present Illness:   Becka Calle is a 51F  Becka Mccall is a pleasant 51-year-old female who was referred to Dr. Valdovinos's clinic from Dr. Sampson for bilateral hip trochanteric pain.  The patient does have a history of chronic low back pain including SI joint fusion with subsequent revisions and lumbar fusions.  She reports that she has had the bilateral hip pain since childhood.  She denies any specific trauma or injuries that brought them on.  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  Previous treatments include repeated injections, most recently on 01/19/2017 by Dr. Sampson.  With this, she had approximately 90%-100% relief initially with the lidocaine portion of the injection.  These injections do last the injections.  These injections do last for about 2-3 months.  She has also tried ice and takes narcotics.  She currently takes oxycodone 10 mg t.i.d. and OxyContin 20 mg b.i.d. prescribed by her primary care provider for many years.  She feels that the right is worse, but that the left hip is now worsening.  It particularly hurts when she is resting at night and lying on her sides.  She denies any consistent groin or thigh pain.  She denies any recent lumbar injections.  She does use a cane or walker occasionally for longer distances and she is limited to 1 block due to pain.  She also uses a chair lift at  home for up and down the stairs when her pain is particularly bad.  She has tried physical therapy in the past and does a home exercise program for working on her strength.  She also has been evaluated in pain clinics for her low back, SI joint and hip pain.  She recently spent an entire month at the [facility] in a pain evaluation process.  She currently denies any mechanical symptoms of the hip.  She denies any numbness, tingling or weakness.          Prior history of blood clot: none  Prior history of bleeding problems: mild anemia, no bleeding dyscrasias  Prior history of anesthetic complications: none  Currently on opioids:  yes, oxycodone 10 mg TID, oxycontin 20 mg BID  History of Diabetes: none  Disability, was , no smoke, no etoh, lives in [place], house with     Background history:  DX:  1. Bilateral trochanteric bursitis    TREATMENTS:  1. Trochanteric bursal injections, most recently 1/19/2017 by Dr Sampson             Past Medical History:     Patient Active Problem List   Diagnosis     HTN (hypertension)     Hyperlipidemia with target LDL less than 130     Anxiety     Chronic pain disorder     SI (sacroiliac) joint dysfunction     Past Medical History:   Diagnosis Date     Agoraphobia      Depression      Epilepsy (H)      Hyperlipidemia      Hypertension      Lumbar disc disease      Migraines      Obesity      Pain medication agreement      PTSD (post-traumatic stress disorder)      RLS (restless legs syndrome)      Tachycardia      Vitamin D deficiency                 Past Surgical History:     Past Surgical History:   Procedure Laterality Date     CARPAL TUNNEL RELEASE RT/LT       FUSION SACRAL ILIAC  March 2013     HYSTERECTOMY, PAP NO LONGER INDICATED       LAMINECTOMY, FUSION LUMBAR THREE+ LEVEL, COMBINED  march 2010    had revision     laparoscopic gastrectomy sleeve       OPEN REDUCTION INTERNAL FIXATION PELVIS Right 12/1/2015    Procedure: OPEN REDUCTION INTERNAL FIXATION  PELVIS;  Surgeon: Christian Yu MD;  Location: [facility] OR     OPTICAL TRACKING SYSTEM FUSION SACRAL ILIAC Right 2015    Procedure: OPTICAL TRACKING SYSTEM FUSION SACRAL ILIAC;  Surgeon: Justin Sampson MD;  Location: [facility] OR     REMOVE HARDWARE ANTERIOR PELVIS Right 2015    Procedure: REMOVE HARDWARE ANTERIOR PELVIS;  Surgeon: Christian Yu MD;  Location: [facility] OR     SALPINGO OOPHORECTOMY,R/L/CHANELLE      Salpingo Oophorectomy, RT/LT/CHANELLE     TONSILLECTOMY & ADENOIDECTOMY              Social History:     Social History     Social History     Marital status:      Spouse name: N/A     Number of children: N/A     Years of education: N/A     Occupational History     Not on file.     Social History Main Topics     Smoking status: Former Smoker     Quit date: 10/10/2008     Smokeless tobacco: Never Used      Comment: 2 cartons in 25 years     Alcohol use No     Drug use: No     Sexual activity: Not on file     Other Topics Concern     Not on file     Social History Narrative     The patient lives in [place] with her  in house.          Family History:       Family History   Problem Relation Age of Onset     Genitourinary Problems Mother 88     born 1924     Hypertension Mother 40     HEART DISEASE Father 50      89yo and dej disc     Family History Negative Son      Breast Cancer Maternal Grandmother       39yo      Alzheimer Disease Maternal Grandfather 70      71yo      CEREBROVASCULAR DISEASE Maternal Grandfather      age?     CEREBROVASCULAR DISEASE Mother 50     HEART DISEASE Paternal Grandmother       59yo      HEART DISEASE Paternal Grandfather       59yo     HEART DISEASE Brother 50     Family History Negative Brother      Family History Negative Sister      Family History Negative Sister        Family history of blood clot: none  Family history of bleeding problems: none  Family history of anesthetic complications: none          "Medications:     Current Outpatient Prescriptions   Medication Sig     OXYCODONE HCL PO Take 10 mg by mouth 3 times daily      methocarbamol (ROBAXIN) 500 MG tablet TAKE 1 TABLET(500 MG) BY MOUTH THREE TIMES DAILY AS NEEDED FOR MUSCLE SPASMS     oxyCODONE (OXYCONTIN) 20 MG 12 hr tablet Take 1 tablet (20 mg) by mouth every 12 hours     senna-docusate (SENOKOT-S;PERICOLACE) 8.6-50 MG per tablet Take 2 tablets by mouth 2 times daily as needed for constipation Pt taking differently: 7 tablets every other evening     losartan (COZAAR) 50 MG tablet Take 50 mg by mouth daily     Nystatin (NYSTOP) 096972 UNIT/GM POWD Externally apply topically 2 times daily as needed     simvastatin (ZOCOR) 10 MG tablet Take 10 mg by mouth At Bedtime     diclofenac (VOLTAREN) 1 % GEL Small amount to affected area QID prn     DIAZEPAM PO Take 5 mg by mouth every 8 hours as needed      OMEPRAZOLE PO Take 20 mg by mouth every morning     traZODone (DESYREL) 50 MG tablet Take by mouth At Bedtime     SUMAtriptan (IMITREX) 100 MG tablet Take by mouth at onset of headache     LORazepam (ATIVAN) 1 MG tablet Take 1 tablet (1 mg) by mouth every 12 hours as needed for anxiety (Patient taking differently: Take 2 mg by mouth At Bedtime )     venlafaxine (EFFEXOR-XR) 75 MG 24 hr capsule Take 3 capsules (225 mg) by mouth daily     metoprolol (TOPROL-XL) 100 MG 24 hr tablet Take 1 tablet (100 mg) by mouth daily     ARIPiprazole (ABILIFY) 5 MG tablet Take 1 tablet (5 mg) by mouth daily     No current facility-administered medications for this visit.                   Physical Exam:     EXAMINATION pertinent findings:   VITAL SIGNS: Height 1.676 m (5' 6\"), weight 91.2 kg (201 lb).  Body mass index is 32.44 kg/(m^2).  GEN: AOx3, appears stated age, cooperative, no distress  HEENT: normocephalic, atraumatic  RESP: non labored breathing   ABD: benign   SKIN: No rashes or open lesions   CV: Non-tachycardic   NEURO: CN2-12 grossly intact   VASCULAR: 2+ DP pulse "   MUSCULOSKELETAL:   The patient demonstrates an antalgic gait with decreased stance time on the right side.  In standing position, she is able to support a single limb stance, although this causes a significant amount of pain.  There is no obvious Trendelenburg sign bilaterally.  She has significant tenderness to palpation along the greater trochanter, and this elicits a yelp from the patient when either right side or left side is palpated.      She otherwise has full range of motion of the bilateral hips.  She has a smooth arc of motion through full range from 0-110 degrees of flexion and extension bilaterally.  She has full internal and external rotation with no crepitus.  Both movements do cause some pain along the greater trochanter, but none within the groin.  She has a negative straight leg raise test for back pain or radiating pain.  She demonstrates significant weakness an abductor complex with side-lying hip abduction.  She is unable to perform a sustained abduction position against resistant force, and this elicits a significant amount of pain bilaterally.  Clamshell exercises also demonstrates weakness of the gluteus medius.  She has a negative Pooja's test for a tight IT band, but does elicit pain, right greater than left.  CMS is intact.  Motor intact to EHL/FHL/TA/GSC.  SILT to SP, DP, sural, saphenous and tibial nerve distributions.  Foot is warm and well perfused, and cap refill is less than 2 seconds.   Dictated by Ari Villagomez MD, Resident                    Data:   Imagin2017 bilateral hip AP/lat   -no acute bony pathology, hips are concentric with well maintained joint line             Assessment and Plan:   Assessment:  51F with hx of chronic low back pain with pervious lumbar and SI joint fusions, with chronic greater trochanteric bursitis in setting of hip abductor weakness    DIscussed with patient that her symptoms are likely from chronic weakness of abductors and that best  treatment would be continued and aggressive strengthening, which is difficult given her chronic pain.  We also discussed that she may benefit from pool therapy.  Recommened follow up in pain clinic and that she consider attempts to wean off the narcotic pain medication.     Plan:  Injections today, referral to PT  Follow up PRN  Recommend f/u with pain clinic or non-op sports for repeated troch injections    PROCEDURE NOTE:    After obtaining informed consent, under sterile precautions, bilateral greater trochanteric injections were performed.  5 cc of 1% lidocaine and 40 mg/ml of depo-medrol were used.  Sterile dressing applied.  She tolerated the procedure well.  Following the injections, she continued to demonstrate weakness of her bilateral hip abduction complex.     Rik Valdovinos M.D.     Arthritis and Joint Replacement  Department of Orthopaedic Surgery, [facility]  [email or web address]  [phone] (pager)           Review of Systems:       Answers for HPI/ROS submitted by the patient on 4/19/2017   General Symptoms: Yes  Skin Symptoms: Yes  HENT Symptoms: Yes  EYE SYMPTOMS: Yes  HEART SYMPTOMS: Yes  LUNG SYMPTOMS: Yes  INTESTINAL SYMPTOMS: No  URINARY SYMPTOMS: Yes  GYNECOLOGIC SYMPTOMS: No  BREAST SYMPTOMS: No  SKELETAL SYMPTOMS: Yes  BLOOD SYMPTOMS: Yes  NERVOUS SYSTEM SYMPTOMS: No  MENTAL HEALTH SYMPTOMS: No  Fever: No  Loss of appetite: No  Weight loss: No  Weight gain: Yes  Fatigue: Yes  Night sweats: No  Chills: No  Increased stress: No  Excessive hunger: No  Excessive thirst: No  Feeling hot or cold when others believe the temperature is normal: Yes  Loss of height: No  Post-operative complications: Yes  Surgical site pain: No  Change in or Loss of Energy: Yes  Hyperactivity: No  Confusion: No  Changes in hair: Yes  Changes in moles/birth marks: No  Itching: Yes  Rashes: Yes  Changes in nails: No  Acne: No  Hair in places you don't want it: No  Change in facial hair:  No  Warts: No  Non-healing sores: No  Flaking of skin: No  Color changes of hands/feet in cold : No  Sun sensitivity: No  Skin thickening: No  Ear pain: No  Ear discharge: No  Hearing loss: No  Tinnitus: No  Congestion: Yes  Sinus pain: Yes  Trouble swallowing: No   Voice hoarseness: No  Mouth sores: No  Sore throat: No  Tooth pain: No  Gum tenderness: No  Bleeding gums: No  Change in taste: No  Change in sense of smell: No  Dry mouth: No  Hearing aid used: No  Neck lump: No  Eye pain: No  Vision loss: No  Dry eyes: No  Watery eyes: No  Eye bulging: No  Double vision: No  Flashing of lights: No  Spots: No  Floaters: Yes  Redness: No  Crossed eyes: No  Tunnel Vision: No  Yellowing of eyes: No  Eye irritation: No  Cough: No  Sputum or phlegm: Yes  Coughing up blood: No  Difficulty breating or shortness of breath: No  Snoring: Yes  Wheezing: No  Difficulty breathing on exertion: No  Respiratory pain: No  Nighttime Cough: No  Difficulty breathing when lying flat: No  Chest pain or pressure: No  Fast or irregular heartbeat: No  Pain in legs with walking: Yes  Swelling in feet or ankles: No  Trouble breathing while lying down: No  Fingers or Toes appear blue: No  High blood pressure: Yes  Low blood pressure: No  Fainting: No  Murmurs: No  Chest pain on exertion: No  Chest pain at rest: No  Cramping pain in leg during exercise: No  Pacemaker: No  Varicose veins: No  Edema or swelling: No  Fast heart beat: No  Wake up at night with shortness of breath: No  Heart flutters: No  Light-headedness: No  Exercise intolerance: Yes  Trouble holding urine or incontinence: No  Trouble starting or stopping: No  Increased frequency of urination: Yes  Blood in urine: No  Decreased frequency of urination: No  Frequent nighttime urination: No  Flank pain: No  Difficulty emptying bladder: No  Back pain: Yes  Muscle aches: Yes  Neck pain: No  Swollen joints: No  Joint pain: Yes  Bone pain: No  Muscle cramps: No  Muscle weakness: Yes  Joint  stiffness: Yes  Bone fracture: No  Anemia: Yes  Swollen glands: No  Easy bleeding or bruising: No      Again, thank you for allowing me to participate in the care of your patient.      Sincerely,    Rik Valdovinos MD

## 2017-04-19 NOTE — MR AVS SNAPSHOT
After Visit Summary   2017    Becka Calle    MRN: [id number]           Patient Information     Date Of Birth          1965        Visit Information        Provider Department      2017 2:30 PM Rik Valdovinos MD [facility]        Today's Diagnoses     Greater trochanteric bursitis of both hips    -  1       Follow-ups after your visit        Additional Services     PHYSICAL THERAPY REFERRAL (External-Prints)       Physical Therapy Referral                  Who to contact     Please call your clinic at [phone] to:    Ask questions about your health    Make or cancel appointments    Discuss your medicines    Learn about your test results    Speak to your doctor   If you have compliments or concerns about an experience at your clinic, or if you wish to file a complaint, please contact [facility] Patient Relations at [phone] or email us at [email or web address]         Additional Information About Your Visit        MyChart Information     [employer] is an electronic gateway that provides easy, online access to your medical records. With [employer], you can request a clinic appointment, read your test results, renew a prescription or communicate with your care team.     To sign up for [employer] visit the website at [email or web address]   You will be asked to enter the access code listed below, as well as some personal information. Please follow the directions to create your username and password.     Your access code is: [email or web address]  Expires: 2017  4:13 PM     Your access code will  in 90 days. If you need help or a new code, please contact your [facility] Clinic or call [phone] for assistance.        Care EveryWhere ID     This is your Care EveryWhere ID. This could be used by other organizations to access your [facility] medical records  [id number]        Your Vitals Were      "Height BMI (Body Mass Index)                1.676 m (5' 6\") 32.44 kg/m2           Blood Pressure from Last 3 Encounters:   12/06/15 141/73   12/17/14 99/60   10/27/14 140/80    Weight from Last 3 Encounters:   04/19/17 91.2 kg (201 lb)   01/19/17 89.8 kg (198 lb)   12/01/15 79.9 kg (176 lb 2.4 oz)              We Performed the Following     Large Joint/Bursa injection and/or drainage - Bilateral (Shoulder, Knee) [52999] [50 Mod]     PHYSICAL THERAPY REFERRAL (External-Prints)          Today's Medication Changes          These changes are accurate as of: 4/19/17 11:59 PM.  If you have any questions, ask your nurse or doctor.               These medicines have changed or have updated prescriptions.        Dose/Directions    LORazepam 1 MG tablet   Commonly known as:  ATIVAN   This may have changed:    - how much to take  - when to take this   Used for:  Anxiety        Dose:  1 mg   Take 1 tablet (1 mg) by mouth every 12 hours as needed for anxiety   Quantity:  60 tablet   Refills:  0                Primary Care Provider Office Phone # Fax #    Shanna Mccallum PA-C [phone]       [place] [address]        Thank you!     Thank you for choosing [facility]  for your care. Our goal is always to provide you with excellent care. Hearing back from our patients is one way we can continue to improve our services. Please take a few minutes to complete the written survey that you may receive in the mail after your visit with us. Thank you!             Your Updated Medication List - Protect others around you: Learn how to safely use, store and throw away your medicines at www.disposemymeds.org.          This list is accurate as of: 4/19/17 11:59 PM.  Always use your most recent med list.                   Brand Name Dispense Instructions for use    ARIPiprazole 5 MG tablet    ABILIFY    60 tablet    Take 1 tablet (5 mg) by mouth daily       DIAZEPAM PO      Take " 5 mg by mouth every 8 hours as needed       diclofenac 1 % Gel topical gel    VOLTAREN    60 g    Small amount to affected area QID prn       LORazepam 1 MG tablet    ATIVAN    60 tablet    Take 1 tablet (1 mg) by mouth every 12 hours as needed for anxiety       losartan 50 MG tablet    COZAAR     Take 50 mg by mouth daily       methocarbamol 500 MG tablet    ROBAXIN    30 tablet    TAKE 1 TABLET(500 MG) BY MOUTH THREE TIMES DAILY AS NEEDED FOR MUSCLE SPASMS       metoprolol 100 MG 24 hr tablet    TOPROL-XL    90 tablet    Take 1 tablet (100 mg) by mouth daily       NYSTOP 989325 UNIT/GM Powd   Generic drug:  nystatin      Externally apply topically 2 times daily as needed       OMEPRAZOLE PO      Take 20 mg by mouth every morning       * OXYCODONE HCL PO      Take 10 mg by mouth 3 times daily       * oxyCODONE 20 MG 12 hr tablet    OxyCONTIN    40 tablet    Take 1 tablet (20 mg) by mouth every 12 hours       senna-docusate 8.6-50 MG per tablet    SENOKOT-S;PERICOLACE    80 tablet    Take 2 tablets by mouth 2 times daily as needed for constipation Pt taking differently: 7 tablets every other evening       simvastatin 10 MG tablet    ZOCOR     Take 10 mg by mouth At Bedtime       SUMAtriptan 100 MG tablet    IMITREX     Take by mouth at onset of headache       traZODone 50 MG tablet    DESYREL     Take by mouth At Bedtime       venlafaxine 75 MG 24 hr capsule    EFFEXOR-XR    270 capsule    Take 3 capsules (225 mg) by mouth daily       * Notice:  This list has 2 medication(s) that are the same as other medications prescribed for you. Read the directions carefully, and ask your doctor or other care provider to review them with you.

## 2017-04-24 RX ORDER — LIDOCAINE HYDROCHLORIDE 10 MG/ML
10 INJECTION, SOLUTION INFILTRATION; PERINEURAL ONCE
Status: ACTIVE | OUTPATIENT
Start: 2017-04-24

## (undated) RX ORDER — BUPIVACAINE HYDROCHLORIDE 5 MG/ML
INJECTION, SOLUTION EPIDURAL; INTRACAUDAL
Status: DISPENSED
Start: 2017-01-19

## (undated) RX ORDER — LIDOCAINE HYDROCHLORIDE 10 MG/ML
INJECTION, SOLUTION INFILTRATION; PERINEURAL
Status: DISPENSED
Start: 2017-04-19

## (undated) RX ORDER — METHYLPREDNISOLONE ACETATE 40 MG/ML
INJECTION, SUSPENSION INTRA-ARTICULAR; INTRALESIONAL; INTRAMUSCULAR; SOFT TISSUE
Status: DISPENSED
Start: 2017-01-19

## (undated) RX ORDER — LIDOCAINE HYDROCHLORIDE 10 MG/ML
INJECTION, SOLUTION INFILTRATION; PERINEURAL
Status: DISPENSED
Start: 2017-01-19

## (undated) RX ORDER — TRIAMCINOLONE ACETONIDE 40 MG/ML
INJECTION, SUSPENSION INTRA-ARTICULAR; INTRAMUSCULAR
Status: DISPENSED
Start: 2017-04-19